# Patient Record
Sex: FEMALE | Race: WHITE
[De-identification: names, ages, dates, MRNs, and addresses within clinical notes are randomized per-mention and may not be internally consistent; named-entity substitution may affect disease eponyms.]

---

## 2019-10-11 ENCOUNTER — HOSPITAL ENCOUNTER (OUTPATIENT)
Dept: HOSPITAL 8 - OUT | Age: 64
Discharge: HOME | End: 2019-10-11
Attending: INTERNAL MEDICINE
Payer: MEDICARE

## 2019-10-11 VITALS — WEIGHT: 263.23 LBS | HEIGHT: 63.5 IN | BODY MASS INDEX: 46.06 KG/M2

## 2019-10-11 VITALS — DIASTOLIC BLOOD PRESSURE: 86 MMHG | SYSTOLIC BLOOD PRESSURE: 134 MMHG

## 2019-10-11 DIAGNOSIS — J44.9: ICD-10-CM

## 2019-10-11 DIAGNOSIS — R13.14: Primary | ICD-10-CM

## 2019-10-11 DIAGNOSIS — Z91.040: ICD-10-CM

## 2019-10-11 DIAGNOSIS — Z88.8: ICD-10-CM

## 2019-10-11 DIAGNOSIS — E66.01: ICD-10-CM

## 2019-10-11 DIAGNOSIS — K31.7: ICD-10-CM

## 2019-10-11 PROCEDURE — 43453 DILATE ESOPHAGUS: CPT

## 2019-10-11 PROCEDURE — 43239 EGD BIOPSY SINGLE/MULTIPLE: CPT

## 2019-10-11 PROCEDURE — 88305 TISSUE EXAM BY PATHOLOGIST: CPT

## 2019-10-11 PROCEDURE — 00731 ANES UPR GI NDSC PX NOS: CPT

## 2021-01-14 ENCOUNTER — OFFICE VISIT (OUTPATIENT)
Dept: URGENT CARE | Facility: PHYSICIAN GROUP | Age: 66
End: 2021-01-14
Payer: COMMERCIAL

## 2021-01-14 VITALS
WEIGHT: 270 LBS | BODY MASS INDEX: 46.1 KG/M2 | RESPIRATION RATE: 19 BRPM | OXYGEN SATURATION: 94 % | TEMPERATURE: 97.7 F | DIASTOLIC BLOOD PRESSURE: 78 MMHG | HEART RATE: 100 BPM | HEIGHT: 64 IN | SYSTOLIC BLOOD PRESSURE: 136 MMHG

## 2021-01-14 DIAGNOSIS — L50.9 HIVE: ICD-10-CM

## 2021-01-14 PROCEDURE — 99203 OFFICE O/P NEW LOW 30 MIN: CPT | Performed by: FAMILY MEDICINE

## 2021-01-14 RX ORDER — EPINEPHRINE 0.3 MG/.3ML
0.3 INJECTION SUBCUTANEOUS ONCE
Qty: 0.3 ML | Refills: 11 | Status: SHIPPED | OUTPATIENT
Start: 2021-01-14 | End: 2021-01-14

## 2021-01-14 RX ORDER — PREDNISONE 10 MG/1
TABLET ORAL
Qty: 21 TAB | Refills: 0 | Status: SHIPPED | OUTPATIENT
Start: 2021-01-14

## 2021-01-14 RX ORDER — CEPHALEXIN 500 MG/1
CAPSULE ORAL
COMMUNITY
Start: 2021-01-07 | End: 2021-01-14

## 2021-01-14 RX ORDER — METHYLPREDNISOLONE SODIUM SUCCINATE 125 MG/2ML
125 INJECTION, POWDER, LYOPHILIZED, FOR SOLUTION INTRAMUSCULAR; INTRAVENOUS ONCE
Status: COMPLETED | OUTPATIENT
Start: 2021-01-14 | End: 2021-01-14

## 2021-01-14 RX ORDER — SULFAMETHOXAZOLE AND TRIMETHOPRIM 800; 160 MG/1; MG/1
1 TABLET ORAL
COMMUNITY
Start: 2021-01-07 | End: 2021-01-14

## 2021-01-14 RX ORDER — FLUCONAZOLE 150 MG/1
TABLET ORAL
COMMUNITY
Start: 2021-01-07 | End: 2021-01-14

## 2021-01-14 RX ADMIN — METHYLPREDNISOLONE SODIUM SUCCINATE 125 MG: 125 INJECTION, POWDER, LYOPHILIZED, FOR SOLUTION INTRAMUSCULAR; INTRAVENOUS at 18:47

## 2021-01-15 NOTE — PROGRESS NOTES
"Subjective:      Emely Mcallister is a 65 y.o. female who presents with Rash (\"inside' belly , took medication and it returned, got vaginal and abd yeast infection. )            This is a new problem.  65-year-old presenting for evaluation of worsening hives well which is starting on her torso and now in the lower extremity.  She denies any trouble swallowing or breathing.  Few days ago she was seen and started on Diflucan for rash that she had and some vaginal itching irritation which she finished.  This started after taking Diflucan but she was also on Bactrim and Keflex prior to that and altogether for the past 7 days for infection she had in the pannus which is significant better.  She denies any fever chills.  Review of system otherwise negative.      Review of Systems   All other systems reviewed and are negative.         Objective:     /78   Pulse 100   Temp 36.5 °C (97.7 °F)   Resp 19   Ht 1.626 m (5' 4\")   Wt 122.5 kg (270 lb)   SpO2 94%   BMI 46.35 kg/m²      Physical Exam  Constitutional:       General: She is not in acute distress.     Appearance: She is not ill-appearing, toxic-appearing or diaphoretic.   HENT:      Head: Normocephalic and atraumatic.      Right Ear: External ear normal.      Left Ear: External ear normal.      Mouth/Throat:      Mouth: Mucous membranes are moist.      Pharynx: Oropharynx is clear. Uvula midline. No pharyngeal swelling, oropharyngeal exudate, posterior oropharyngeal erythema or uvula swelling.   Eyes:      Conjunctiva/sclera: Conjunctivae normal.   Cardiovascular:      Rate and Rhythm: Normal rate and regular rhythm.      Heart sounds: No murmur. No friction rub. No gallop.    Pulmonary:      Effort: No respiratory distress.      Breath sounds: No wheezing, rhonchi or rales.   Skin:     General: Skin is warm.      Coloration: Skin is not jaundiced or pale.      Findings: Rash present. Rash is urticarial (Urticarial rash noted more pronounced in the torso " and some in the lower extremity.).      Comments: Inspection of the pannus where she had a infection revealed significant improvement, nontender.  No fluctuation.   Neurological:      Mental Status: She is alert and oriented to person, place, and time.   Psychiatric:         Mood and Affect: Mood normal.                 Assessment/Plan:        1. Hive  - predniSONE (DELTASONE) 10 MG Tab; Start 60 mg prednisone on day one, 50 mg PO on day two, 40mg PO on day three, 30 mg on day four, 20 mg on day five, 10 mg p.o. on last day  Dispense: 21 Tab; Refill: 0  - EPINEPHrine (EPIPEN 2-LIZETT) 0.3 MG/0.3ML Solution Auto-injector solution for injection; Inject 0.3 mL into the shoulder, thigh, or buttocks one time for 1 dose.  Dispense: 0.3 mL; Refill: 11  - methylPREDNISolone sod succ (SOLU-MEDROL) 125 MG injection 125 mg    It is absolutely unclear as to which medication caused the hives, in this case could be Bactrim, Keflex or Diflucan.  Her infection in the pannus is significantly improved so I recommended to immediately stop Bactrim and Keflex as one of them could be the culprit.  She also was treated with Diflucan which could have caused the hives.  In any case she was given a shot of Solu-Medrol here in the office and started on steroid taper which she would start tomorrow.  Discussed over-the-counter Benadryl versus Allegra use +/-adding Pepcid to that as well    I do not see any evidence of significant more systemic allergic reaction but she was given a prescription for EpiPen in case she has more severe symptoms as this can take few half-lives, at least 3 to get out of her system.  Plan per orders and instructions  Warning signs reviewed  Close follow-up with primary care recommended next week.

## 2021-01-15 NOTE — PATIENT INSTRUCTIONS
You have classic hives which we need to treat with oral steroid as the most potent antihistamine which comes as a form of taper for you which you start tomorrow    We also want to start over-the-counter antihistamine like Benadryl, one tablet every 4 hours as needed but this medication can be sedating.  Instead of Benadryl that he can use one  Allegra twice daily as needed    Would also recommend using a daily Pepcid which is also an antihistamine    You have a prescription for EpiPen in case you have more severe reaction in which case you want to use your EpiPen and call 911 and be seen immediately in the emergency department setting but I do not expect that to happen    Since it is unclear as to which antibiotic or antifungal caused your hives I would recommend stopping all of it.  Your inflammation in the abdominal wall is healing significantly after taking 1 week of antibiotic and I do not think you need more unless there is any worsening in which case would recommend being seen in the emergency department setting    Otherwise would also recommend following up with your primary care doctor next week at [facility]  You may be a good candidate for referral for allergy testing if available to see if they can sort out your allergies which he came here for today.    For more general information about hives see the handout below        Hives  Hives are itchy, red, swollen areas on your skin. Hives can show up on any part of your body. Hives often fade within 24 hours (acute hives). New hives can show up after old ones fade. This can go on for many days or weeks (chronic hives). Hives do not spread from person to person (are not contagious).  Hives are caused by your body's response to something that you are allergic to (allergen). These are sometimes called triggers. You can get hives right after being around a trigger, or hours later.  What are the causes?  · Allergies to foods.  · Insect bites or  stings.  · Pollen.  · Pets.  · Latex.  · Chemicals.  · Spending time in sunlight, heat, or cold.  · Exercise.  · Stress.  · Some medicines.  · Viruses. This includes the common cold.  · Infections caused by germs (bacteria).  · Allergy shots.  · Blood transfusions.  Sometimes, the cause is not known.  What increases the risk?  · Being a woman.  · Being allergic to foods such as:  ? Citrus fruits.  ? Milk.  ? Eggs.  ? Peanuts.  ? Tree nuts.  ? Shellfish.  · Being allergic to:  ? Medicines.  ? Latex.  ? Insects.  ? Animals.  ? Pollen.  What are the signs or symptoms?    · Raised, itchy, red or white bumps or patches on your skin. These areas may:  ? Get large and swollen.  ? Change in shape and location.  ? Stand alone or connect to each other over a large area of skin.  ? Sting or hurt.  ? Turn white when pressed in the center (benny).  In very bad cases, your hands, feet, and face may also get swollen. This may happen if hives start deeper in your skin.  How is this treated?  Treatment for this condition depends on your symptoms. Treatment may include:  · Using cool, wet cloths (cool compresses) or taking cool showers to stop the itching.  · Medicines that help:  ? Relieve itching (antihistamines).  ? Reduce swelling (corticosteroids).  ? Treat infection (antibiotics).  · A medicine (omalizumab) that is given as a shot (injection). Your doctor may prescribe this if you have hives that do not get better even after other treatments.  · In very bad cases, you may need a shot of a medicine called epinephrineto prevent a life-threatening allergic reaction (anaphylaxis).  Follow these instructions at home:  Medicines  · Take or apply over-the-counter and prescription medicines only as told by your doctor.  · If you were prescribed an antibiotic medicine, use it as told by your doctor. Do not stop using it even if you start to feel better.  Skin care  · Apply cool, wet cloths to the hives.  · Do not scratch your skin. Do  not rub your skin.  General instructions  · Do not take hot showers or baths. This can make itching worse.  · Do not wear tight clothes.  · Use sunscreen and wear clothes that cover your skin when you are outside.  · Avoid any triggers that cause your hives. Keep a journal to help track what causes your hives. Write down:  ? What medicines you take.  ? What you eat and drink.  ? What products you use on your skin.  · Keep all follow-up visits as told by your doctor. This is important.  Contact a doctor if:  · Your symptoms are not better with medicine.  · Your joints hurt or are swollen.  Get help right away if:  · You have a fever.  · You have pain in your belly (abdomen).  · Your tongue or lips are swollen.  · Your eyelids are swollen.  · Your chest or throat feels tight.  · You have trouble breathing or swallowing.  These symptoms may be an emergency. Do not wait to see if the symptoms will go away. Get medical help right away. Call your local emergency services (911 in the [place] Do not drive yourself to the hospital.  Summary  · Hives are itchy, red, swollen areas on your skin.  · Treatment for this condition depends on your symptoms.  · Avoid things that cause your hives. Keep a journal to help track what causes your hives.  · Take and apply over-the-counter and prescription medicines only as told by your doctor.  · Keep all follow-up visits as told by your doctor. This is important.  This information is not intended to replace advice given to you by your health care provider. Make sure you discuss any questions you have with your health care provider.  Document Released: 09/26/2009 Document Revised: 07/03/2019 Document Reviewed: 07/03/2019  Elsevier Patient Education © 2020 [employer]

## 2021-03-15 DIAGNOSIS — Z23 NEED FOR VACCINATION: ICD-10-CM

## 2021-11-11 ENCOUNTER — OFFICE VISIT (OUTPATIENT)
Dept: MEDICAL GROUP | Facility: CLINIC | Age: 66
End: 2021-11-11
Payer: COMMERCIAL

## 2021-11-11 VITALS
DIASTOLIC BLOOD PRESSURE: 78 MMHG | WEIGHT: 271 LBS | TEMPERATURE: 97.8 F | RESPIRATION RATE: 16 BRPM | BODY MASS INDEX: 49.87 KG/M2 | OXYGEN SATURATION: 94 % | HEIGHT: 62 IN | SYSTOLIC BLOOD PRESSURE: 122 MMHG | HEART RATE: 104 BPM

## 2021-11-11 DIAGNOSIS — J43.8 OTHER EMPHYSEMA (HCC): ICD-10-CM

## 2021-11-11 DIAGNOSIS — H04.201 EYE TEARING, RIGHT: Primary | ICD-10-CM

## 2021-11-11 DIAGNOSIS — M79.7 FIBROMYALGIA: ICD-10-CM

## 2021-11-11 PROCEDURE — 99213 OFFICE O/P EST LOW 20 MIN: CPT | Performed by: STUDENT IN AN ORGANIZED HEALTH CARE EDUCATION/TRAINING PROGRAM

## 2021-11-11 RX ORDER — MUPIROCIN CALCIUM 20 MG/G
CREAM TOPICAL
COMMUNITY
Start: 2021-08-26

## 2021-11-11 RX ORDER — TIZANIDINE 4 MG/1
4 TABLET ORAL EVERY 6 HOURS PRN
COMMUNITY

## 2021-11-11 ASSESSMENT — PATIENT HEALTH QUESTIONNAIRE - PHQ9: CLINICAL INTERPRETATION OF PHQ2 SCORE: 0

## 2021-11-11 NOTE — PROGRESS NOTES
Subjective:   CC:   Chief Complaint   Patient presents with   • Eye Problem     Ep/ Blacked / Tear Duct       HPI: Emely is a 65 y.o. female who presents today for the following problems:    Problem   Eye Tearing, Right    CC: right eye tearing, intermittent, for months    O: 3 months ago began, subsided, then 2 weeks ago began again  L: right eye  D: as above  C: some pain in the corner of her eye -- mild to moderate pain  A: glasses on bridge of nose   R: ibuprofen helped  T: no waking from sleep  Associated Sx: denies crusting     Fibromyalgia   Other Emphysema (Hcc)       Current Outpatient Medications   Medication Sig Dispense Refill   • BREO ELLIPTA 100-25 MCG/INH AEROSOL POWDER, BREATH ACTIVATED      • tizanidine (ZANAFLEX) 4 MG Tab Take 4 mg by mouth every 6 hours as needed. Ad needed     • mupirocin calcium (BACTROBAN) 2 % Cream As needed (Patient not taking: Reported on 11/11/2021)     • predniSONE (DELTASONE) 10 MG Tab Start 60 mg prednisone on day one, 50 mg PO on day two, 40mg PO on day three, 30 mg on day four, 20 mg on day five, 10 mg p.o. on last day (Patient not taking: Reported on 11/11/2021) 21 Tab 0   • Aclidinium Bromide (TUDORZA PRESSAIR) 400 MCG/ACT AEROSOL POWDER, BREATH ACTIVATED Inhale 400 mcg by mouth 2 Times a Day. 1 puff BID (Patient not taking: Reported on 11/11/2021) 1 Each 6     No current facility-administered medications for this visit.       Social History     Socioeconomic History   • Marital status: Unknown     Spouse name: Not on file   • Number of children: Not on file   • Years of education: Not on file   • Highest education level: Not on file   Occupational History   • Not on file   Tobacco Use   • Smoking status: Former Smoker   • Smokeless tobacco: Never Used   Substance and Sexual Activity   • Alcohol use: Not on file   • Drug use: Not on file   • Sexual activity: Not on file   Other Topics Concern   • Not on file   Social History Narrative   • Not on file     Social  "Determinants of Health     Financial Resource Strain:    • Difficulty of Paying Living Expenses: Not on file   Food Insecurity:    • Worried About Running Out of Food in the Last Year: Not on file   • Ran Out of Food in the Last Year: Not on file   Transportation Needs:    • Lack of Transportation (Medical): Not on file   • Lack of Transportation (Non-Medical): Not on file   Physical Activity:    • Days of Exercise per Week: Not on file   • Minutes of Exercise per Session: Not on file   Stress:    • Feeling of Stress : Not on file   Social Connections:    • Frequency of Communication with Friends and Family: Not on file   • Frequency of Social Gatherings with Friends and Family: Not on file   • Attends Rastafarian Services: Not on file   • Active Member of Clubs or Organizations: Not on file   • Attends Club or Organization Meetings: Not on file   • Marital Status: Not on file   Intimate Partner Violence:    • Fear of Current or Ex-Partner: Not on file   • Emotionally Abused: Not on file   • Physically Abused: Not on file   • Sexually Abused: Not on file   Housing Stability:    • Unable to Pay for Housing in the Last Year: Not on file   • Number of Places Lived in the Last Year: Not on file   • Unstable Housing in the Last Year: Not on file       Objective:     Vitals:    11/11/21 1458   BP: 122/78   BP Location: Left arm   Pulse: (!) 104   Resp: 16   Temp: 36.6 °C (97.8 °F)   SpO2: 94%   Weight: 123 kg (271 lb)   Height: 1.575 m (5' 2\")     Body mass index is 49.57 kg/m².     Physical Exam:   Gen: Well developed, well nourished in no acute distress.   Skin: Pink, warm, and dry  HEENT: conjunctiva non-injected, sclera non-icteric.  Mildly TTP about the right lower canaliculus.  EOMs intact.   Nasal mucosa without edema nor erythema. No facial tenderness  Pinna normal. TM pearly gray.   Oral mucous membranes pink and moist with no lesions.  Neck: Supple, trachea midline. No adenopathy or masses in the neck or " supraclavicular regions.  Lungs: Effort is normal  Ext: no edema, color normal, vascularity normal, temperature normal  Alert and oriented Eye contact is good, speech goal directed, affect calm    Assessment & Plan:   Eye tearing, right  65-year-old female with right eye likely tear duct issue that has been intermittent for the past 2 months.  Uncertain etiology.  Recommended lid hygiene, warm compresses, and NSAIDs.  Referred to ophthalmology.  Follow-up immediately if worsening or changing symptoms.      Followup: No follow-ups on file.    Virgil Marie D.O.    Please note that this dictation was created using voice recognition software. I have made every reasonable attempt to correct obvious errors, but I expect that there are errors of grammar and possibly content that I did not discover before finalizing the note.

## 2021-11-11 NOTE — ASSESSMENT & PLAN NOTE
65-year-old female with right eye likely tear duct issue that has been intermittent for the past 2 months.  Uncertain etiology.  Recommended lid hygiene, warm compresses, and NSAIDs.  Referred to ophthalmology.  Follow-up immediately if worsening or changing symptoms.

## 2021-12-03 DIAGNOSIS — H54.7 VISUAL ACUITY REDUCED: ICD-10-CM
